# Patient Record
Sex: MALE | Race: BLACK OR AFRICAN AMERICAN | NOT HISPANIC OR LATINO | URBAN - METROPOLITAN AREA
[De-identification: names, ages, dates, MRNs, and addresses within clinical notes are randomized per-mention and may not be internally consistent; named-entity substitution may affect disease eponyms.]

---

## 2019-03-29 ENCOUNTER — OFFICE (OUTPATIENT)
Dept: URBAN - METROPOLITAN AREA CLINIC 11 | Facility: CLINIC | Age: 44
End: 2019-03-29
Payer: MEDICAID

## 2019-03-29 VITALS
HEIGHT: 74 IN | SYSTOLIC BLOOD PRESSURE: 144 MMHG | WEIGHT: 293 LBS | HEART RATE: 92 BPM | DIASTOLIC BLOOD PRESSURE: 90 MMHG

## 2019-03-29 DIAGNOSIS — K80.20 CALCULUS OF GALLBLADDER WITHOUT CHOLECYSTITIS WITHOUT OBSTRU: ICD-10-CM

## 2019-03-29 DIAGNOSIS — K21.9 GASTRO-ESOPHAGEAL REFLUX DISEASE WITHOUT ESOPHAGITIS: ICD-10-CM

## 2019-03-29 DIAGNOSIS — R10.9 UNSPECIFIED ABDOMINAL PAIN: ICD-10-CM

## 2019-03-29 PROCEDURE — 99204 OFFICE O/P NEW MOD 45 MIN: CPT | Performed by: INTERNAL MEDICINE

## 2019-03-29 RX ORDER — PANTOPRAZOLE SODIUM 40 MG/1
40 TABLET, DELAYED RELEASE ORAL
Qty: 30 | Refills: 3 | Status: ACTIVE
Start: 2019-03-29

## 2019-03-29 NOTE — SERVICEHPINOTES
He stated that this last Monday he had to leave work due to "trapped gas in my side" and abdominal pain (epigastric). This was associated with n/v.  He had been having this intermittently for about a month.   He stated that he had to take an ambulance to the ER due to the pain.  He was seen at [facility].  He was told that he had gall stones.  He was given Tylenol #3 which helped.  His n/v are better but his pain has persistent.  It "comes and goes gradually".  He has had some weight loss since this started.  If he eats it is worse and eating may cause nausea/vomiting. He has a history of heartburn and takes Zantac.He has been on Coumadin for a history of clots but he was not able to give much detail.His u/s at [facility] revealed gall stones without cholecystitis.BR

## 2019-03-29 NOTE — SERVICENOTES
We discussed his pain issues, findings from [facility] of gall stones, a dif dx of his upper abdominal symptoms (including ulcer disease, gastritis, gall stone issues, chronic cholecystisi, abdominal wall issues, etc...), and the need for further evaluation with the HIDA and UGI (prior to an EGD noting his comorbid issues and anticoagulation).  He has a surgical f/u next Tuesday as well. (3) Responds only with reflex motor or autonomic effects or totally unresponsive, flaccid, and areflex

## 2019-05-06 ENCOUNTER — OFFICE (OUTPATIENT)
Dept: URBAN - METROPOLITAN AREA CLINIC 11 | Facility: CLINIC | Age: 44
End: 2019-05-06